# Patient Record
Sex: MALE | Race: WHITE | URBAN - METROPOLITAN AREA
[De-identification: names, ages, dates, MRNs, and addresses within clinical notes are randomized per-mention and may not be internally consistent; named-entity substitution may affect disease eponyms.]

---

## 2022-04-07 ENCOUNTER — OFFICE VISIT (OUTPATIENT)
Dept: URBAN - METROPOLITAN AREA CLINIC 51 | Facility: CLINIC | Age: 67
End: 2022-04-07
Payer: MEDICARE

## 2022-04-07 DIAGNOSIS — H43.313 VITREOUS MEMBRANES AND STRANDS, BILATERAL: ICD-10-CM

## 2022-04-07 DIAGNOSIS — H04.123 TEAR FILM INSUFFICIENCY OF BILATERAL LACRIMAL GLANDS: ICD-10-CM

## 2022-04-07 DIAGNOSIS — H35.363 DRUSEN (DEGENERATIVE) OF MACULA, BILATERAL: ICD-10-CM

## 2022-04-07 DIAGNOSIS — H52.4 PRESBYOPIA: ICD-10-CM

## 2022-04-07 DIAGNOSIS — H40.1331 PIGMENTARY GLAUCOMA, BILATERAL, MILD STAGE: Primary | ICD-10-CM

## 2022-04-07 DIAGNOSIS — H25.13 AGE-RELATED NUCLEAR CATARACT, BILATERAL: ICD-10-CM

## 2022-04-07 DIAGNOSIS — H40.033 ANATOMICAL NARROW ANGLE, BILATERAL: ICD-10-CM

## 2022-04-07 PROCEDURE — 92134 CPTRZ OPH DX IMG PST SGM RTA: CPT | Performed by: OPTOMETRIST

## 2022-04-07 PROCEDURE — 92083 EXTENDED VISUAL FIELD XM: CPT | Performed by: OPTOMETRIST

## 2022-04-07 PROCEDURE — 92133 CPTRZD OPH DX IMG PST SGM ON: CPT | Performed by: OPTOMETRIST

## 2022-04-07 PROCEDURE — 99204 OFFICE O/P NEW MOD 45 MIN: CPT | Performed by: OPTOMETRIST

## 2022-04-07 PROCEDURE — 76514 ECHO EXAM OF EYE THICKNESS: CPT | Performed by: OPTOMETRIST

## 2022-04-07 RX ORDER — LATANOPROST 50 UG/ML
0.005 % SOLUTION OPHTHALMIC
Qty: 7.5 | Refills: 3 | Status: ACTIVE
Start: 2022-04-07

## 2022-04-07 ASSESSMENT — INTRAOCULAR PRESSURE
OS: 27
OD: 18
OS: 30
OD: 17

## 2022-04-07 ASSESSMENT — VISUAL ACUITY
OD: 20/20
OS: 20/20

## 2022-04-07 ASSESSMENT — KERATOMETRY
OD: 43.33
OS: 43.69

## 2022-04-07 NOTE — IMPRESSION/PLAN
Impression: Tear film insufficiency of bilateral lacrimal glands: H04.123. *MGD OU Plan: Discussed long term use of glaucoma drops can add to dryness. Recommend the followin) AT's at least BID or more OU 2) Warm compresses for 8-10 minutes

## 2022-04-07 NOTE — IMPRESSION/PLAN
Impression: Anatomical narrow angle, bilateral: H40.033. Plan: History of LPI by outside provider. Patent LPI's OU. Monitor.

## 2022-04-07 NOTE — IMPRESSION/PLAN
Impression: Age-related nuclear cataract, bilateral: H25.13. Plan: Cataracts are not visually significant or effecting ADLs/vision. No treatment is currently warranted due to current level of vision. Patient will monitor vision closely and contact our office with any changes/ worsening in vision. Monitor.

## 2022-04-07 NOTE — IMPRESSION/PLAN
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 *Has been on Latanoprost for 10+ years *History of LPI OU Plan: Large ONH appearance OU
IOPs today: 17/30 on Latanoprost (unsure if he got Latanoprost in OS last night) Pachymetry: 560/589 (average thickness OD, thicker than average OS) HVF 24-2c (04/07/2022) OD: scattered shallows pts  ;  OS: inf central shallow pts RNFL OCT (04/07/2022): bdl sup and inf OU
GCA OCT (04/07/2022): abn OU (poor scan OD) Reviewed today's findings. Asymmetric IOPs. IOP elevated today OS, however, patient is unsure if he got Latanoprost in OS last night (occasionally misses his eye). Patient wishes to be more diligent on ensuring drop goes in both eyes, before discussed starting additional drop. Will recheck IOP in the next few weeks, patient leaving on 04/20 for the summer. Discussed if IOP remains elevated at next visit, will have to discuss additional drop. Continue Latanoprost QHS OU (ERx'd 90 day supply w/ 3 refills to pharmacy) RTC next available for IOP check

## 2022-04-07 NOTE — IMPRESSION/PLAN
Impression: RPE changes of macula, bilateral: H35.363. Plan: Longstanding per patient history. RPE changes, no SRF OU. MAC OCT taken. RTC with any changes/worsening in vision. Monitor with MOCT/FUNDUS PHOTOS.

## 2022-04-07 NOTE — IMPRESSION/PLAN
Impression: Vitreous membranes and strands, bilateral: H43.313. *PVD OU Plan: Retina is attached 360 degrees with no signs of any retinal holes, tears, or detachments observed on today's dilated examination. Patient to RTC ASAP if increase in floaters, flashes, or curtain or veil taking away vision.

## 2022-04-15 ENCOUNTER — OFFICE VISIT (OUTPATIENT)
Dept: URBAN - METROPOLITAN AREA CLINIC 51 | Facility: CLINIC | Age: 67
End: 2022-04-15
Payer: MEDICARE

## 2022-04-15 DIAGNOSIS — H40.1331 PIGMENTARY GLAUCOMA, BILATERAL, MILD STAGE: Primary | ICD-10-CM

## 2022-04-15 PROCEDURE — 99214 OFFICE O/P EST MOD 30 MIN: CPT | Performed by: OPTOMETRIST

## 2022-04-15 RX ORDER — TIMOLOL MALEATE 5 MG/ML
0.5 % SOLUTION/ DROPS OPHTHALMIC
Qty: 5 | Refills: 5 | Status: ACTIVE
Start: 2022-04-15

## 2022-04-15 NOTE — IMPRESSION/PLAN
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 *Has been on Latanoprost for 10+ years *Previously on Dorzolamide but discontinued due to irritation *Denies asthma, COPD, low pulse rate *History of LPI OU Pachymetry: 560/589 (average thickness OD, thicker than average OS) HVF 24-2c (04/07/2022) OD: scattered shallows pts  ;  OS: inf central shallow pts RNFL OCT (04/07/2022): bdl sup and inf OU
GCA OCT (04/07/2022): abn OU (poor scan OD) Plan: Large ONH appearance OU
IOPs today: 18/26 on Latanoprost
Reviewed today's findings. Asymmetric IOPs. IOP remains elevated for 2 appointments in a row. Discussed starting additional drop OS only to prevent further glaucomatous changes, patient prefers. Discussed trialing Timolol, Brimonidine, or Dorzolamide (history of Dorzolamide - used for awhile then felt like rock was in eye and was changed). Patient prefers to start Timolol (denies asthma, COPD, low pulse rate). Patient is leaving for the summer, recommend follow up with outside provider for IOP check due to starting additional drop. START Timolol QAM OS only (ERx'd to pharmacy) Continue Latanoprost QHS OU

RTC November 2022 for CE + 24-2c HVF + RNFL/GCA (patient is leaving for the summer)

## 2022-11-22 ENCOUNTER — OFFICE VISIT (OUTPATIENT)
Dept: URBAN - METROPOLITAN AREA CLINIC 51 | Facility: CLINIC | Age: 67
End: 2022-11-22
Payer: MEDICARE

## 2022-11-22 DIAGNOSIS — H04.123 TEAR FILM INSUFFICIENCY OF BILATERAL LACRIMAL GLANDS: Primary | ICD-10-CM

## 2022-11-22 DIAGNOSIS — L71.9 ROSACEA: ICD-10-CM

## 2022-11-22 DIAGNOSIS — H40.1331 PIGMENTARY GLAUCOMA, BILATERAL, MILD STAGE: ICD-10-CM

## 2022-11-22 PROCEDURE — 99213 OFFICE O/P EST LOW 20 MIN: CPT | Performed by: OPTOMETRIST

## 2022-11-22 ASSESSMENT — INTRAOCULAR PRESSURE
OS: 20
OD: 18

## 2022-11-22 NOTE — IMPRESSION/PLAN
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 *Has been on Latanoprost for 10+ years *Timolol d/c 2' dizziness, tightness in the chest, and severe dry eye *History of LPI OU
*Pachymetry: 560/589 (average thickness OD, thicker than average OS) 2022 *[place] part of year Plan: IOP doing better with addition of Dorzolamide OS. Continue Latanoprost QHS OU + Dorzolamide bid OS. Request records from [place].
RTC before leaving x summer March/April- CE, HVF, RNFL/GCA

## 2022-11-22 NOTE — IMPRESSION/PLAN
Impression: Tear film insufficiency of bilateral lacrimal glands: H04.123. *MGD OU Plan: Educated patient on dry eye condition, symptoms and relief. Eyes do appear dry which accounts for patients complaints. Discussed long term use of glaucoma drops can add to dryness. Will have patient start diligent at home dry eye regimen. Dry eye handout sheet dispensed to patient. Recommend the followin) AT's vs. gel at least 2-3 times per day or more OU 2) START Warm compresses for 8-10 minutes using a bruders mask vs. dry rice in a clean sock 3) START daily fish oil supplement - Discussed PRN Dry Eye Omega Benefits Liquid (brochure and coupon dispensed to patient)